# Patient Record
Sex: MALE | Race: WHITE | Employment: FULL TIME | URBAN - METROPOLITAN AREA
[De-identification: names, ages, dates, MRNs, and addresses within clinical notes are randomized per-mention and may not be internally consistent; named-entity substitution may affect disease eponyms.]

---

## 2020-01-23 ENCOUNTER — HOSPITAL ENCOUNTER (EMERGENCY)
Age: 30
Discharge: HOME | End: 2020-01-23

## 2020-01-23 VITALS
DIASTOLIC BLOOD PRESSURE: 77 MMHG | WEIGHT: 150 LBS | SYSTOLIC BLOOD PRESSURE: 134 MMHG | HEART RATE: 97 BPM | HEIGHT: 70 IN | OXYGEN SATURATION: 99 % | BODY MASS INDEX: 21.47 KG/M2 | TEMPERATURE: 98.2 F | RESPIRATION RATE: 14 BRPM

## 2020-01-23 PROCEDURE — 99282 EMERGENCY DEPT VISIT SF MDM: CPT

## 2020-01-23 RX ORDER — NAPROXEN 500 MG/1
500 TABLET ORAL 2 TIMES DAILY
Qty: 60 TABLET | Refills: 0 | Status: SHIPPED | OUTPATIENT
Start: 2020-01-23

## 2020-01-23 RX ORDER — CLINDAMYCIN HYDROCHLORIDE 150 MG/1
450 CAPSULE ORAL 3 TIMES DAILY
Qty: 90 CAPSULE | Refills: 0 | Status: SHIPPED | OUTPATIENT
Start: 2020-01-23 | End: 2020-02-02

## 2020-01-23 ASSESSMENT — PAIN SCALES - GENERAL: PAINLEVEL_OUTOF10: 5

## 2020-01-23 ASSESSMENT — PAIN DESCRIPTION - LOCATION: LOCATION: TEETH

## 2020-01-23 ASSESSMENT — PAIN DESCRIPTION - ORIENTATION: ORIENTATION: RIGHT;UPPER

## 2020-01-23 ASSESSMENT — PAIN DESCRIPTION - PAIN TYPE: TYPE: ACUTE PAIN

## 2020-01-23 NOTE — ED NOTES
Reviewed discharge instructions with patient, discussed medications and addressed all patient questions/concerns. Pt verbalizes understanding.        Dianna Michel RN  01/23/20 0260

## 2020-01-23 NOTE — ED PROVIDER NOTES
[facility]     Department of Emergency Medicine   ED  Provider Note  Admit Date/RoomTime: 1/23/2020  1:59 PM  ED Room: 32/32    CHIEF COMPLAINT:   Chief Complaint   Patient presents with    Dental Pain     Pain to right upper gum. x1 day. Denies fever, drainage. ---------------------------------HISTORY OF PRESENT ILLNESS-----------------------------------     Shmuel Wilkerson is a 34 y.o. male presenting to the ED for right upper dental pain that has been ongoing for the past day. Patient denies any fever, sore throat, cough, congestion, difficulty with breathing or swallowing, or recent trauma/injury. Patient states he is originally from [place] and does have a dentist back there. Patient has no chest pain, shortness of breath, abdominal pain, nausea, vomiting, or neck pain. He is alert and oriented ×3 and in no apparent distress at this exam.  He states he has had a dental infection in the past and was given clindamycin. Patient is nontoxic-appearing. Pt declined      Review of Systems:   Pertinent positives and negatives are stated within HPI, all other systems reviewed and are negative.    --------------------------------------------- PAST HISTORY ---------------------------------------------    Past Medical History:  has no past medical history on file. Past Surgical History:  has no past surgical history on file. Social History:  reports that he has been smoking cigarettes. He has been smoking about 0.25 packs per day. He has never used smokeless tobacco. He reports previous alcohol use. He reports that he does not use drugs. Family History: family history is not on file. The patients home medications have been reviewed. Allergies: Gentamycin [gentamicin] and Pcn [penicillins]  Allergies have been reviewed with patient.    Pt has taken Clindamycin in the past     -------------------------------------------------- RESULTS -------------------------------------------------  All laboratory and radiology results have been personally reviewed by myself   LABS:  No results found for this visit on 01/23/20. RADIOLOGY:  Interpreted by Radiologist.  No orders to display     ------------------------- NURSING NOTES AND VITALS REVIEWED ---------------------------  The nursing notes within the ED encounter and vital signs as below have been reviewed. /77   Pulse 97   Temp 98.2 °F (36.8 °C) (Oral)   Resp 14   Ht 5' 10\" (1.778 m)   Wt 150 lb (68 kg)   SpO2 99%   BMI 21.52 kg/m²   Oxygen Saturation Interpretation: Normal    ---------------------------------------------------PHYSICAL EXAM--------------------------------------    Constitutional/General: Alert and oriented x3, well appearing, NAD  HEENT: NC/AT, EOMI, Airway patent  Mouth: The oropharynx is normal. No pharyngeal erythema, no trismus, uvula midline, floor of the mouth is soft. No tenderness in the submental or submandibular space. No tongue elevation. Broken teeth noted to right upper, no obvious abscess  Neck: Supple. Non-tender with no lymphadenopathy   CVS: Regular rate and rhythm  Resp: Clear and equal bilaterally with good airflow, no distress  Musculo: Moves all extremities x 4. Warm and well perfused, No edema   Integument:  Normal turgor. Warm, dry, without visible rash, unless noted elsewhere. Neurological:  Motor functions intact. GCS 15, CN II-XII grossly intact     ------------------------------ ED COURSE/MEDICAL DECISION MAKING----------------------  ED Medications:  Medications - No data to display    Procedures:  None    Consultations:   None    Counseling: The emergency provider has spoken with the patient and discussed todays results, in addition to providing specific details for the plan of care and counseling regarding the diagnosis and prognosis. Questions are answered at this time and they are agreeable with the plan.  All results reviewed with pt and all questions answered. Patient understands that he must follow-up with dentist. Patient was advised to return to ED if symptoms worsen or new symptoms develop. Pt remained nontoxic, afebrile, and A&O x4 during this ED visit. They agreed with plan of care, discharge, and importance of follow-up. Pt was in no distress at discharge. Vitals stable. Patient was educated on newly prescribed medication.      --------------------------------- IMPRESSION AND DISPOSITION ---------------------------------    IMPRESSION  1. Pain, dental    2. Dental abscess      DISPOSITION  Discharge to home  Patient condition is good    NEW MEDICATIONS   Discharge Medication List as of 1/23/2020  2:18 PM      START taking these medications    Details   clindamycin (CLEOCIN) 150 MG capsule Take 3 capsules by mouth 3 times daily for 10 days, Disp-90 capsule, R-0Print      naproxen (NAPROSYN) 500 MG tablet Take 1 tablet by mouth 2 times daily, Disp-60 tablet, R-0Print             NOTE: This report was transcribed using voice recognition software.  Every effort was made to ensure accuracy; however, inadvertent computerized transcription errors may be present    END OF PROVIDER NOTE         Yari Li PA-C  01/26/20 [id number]

## 2020-01-23 NOTE — ED NOTES
Kris Leonrad and myself spoke with patient for triage and assessment. Pt able to communicate with staff and acknowledges understanding. Offered translation service. Pt refuses.         Linda Caal RN  01/23/20 [id number]